# Patient Record
Sex: FEMALE | Race: OTHER
[De-identification: names, ages, dates, MRNs, and addresses within clinical notes are randomized per-mention and may not be internally consistent; named-entity substitution may affect disease eponyms.]

---

## 2019-07-16 ENCOUNTER — HOSPITAL ENCOUNTER (EMERGENCY)
Dept: HOSPITAL 72 - EMR | Age: 29
Discharge: HOME | End: 2019-07-16
Payer: SELF-PAY

## 2019-07-16 VITALS — BODY MASS INDEX: 24.84 KG/M2 | HEIGHT: 62 IN | WEIGHT: 135 LBS

## 2019-07-16 VITALS — DIASTOLIC BLOOD PRESSURE: 78 MMHG | SYSTOLIC BLOOD PRESSURE: 123 MMHG

## 2019-07-16 VITALS — DIASTOLIC BLOOD PRESSURE: 65 MMHG | SYSTOLIC BLOOD PRESSURE: 125 MMHG
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
## 2019-07-16 DIAGNOSIS — S16.1XXA: Primary | ICD-10-CM

## 2019-07-16 PROCEDURE — 72040 X-RAY EXAM NECK SPINE 2-3 VW: CPT

## 2019-07-16 PROCEDURE — 81025 URINE PREGNANCY TEST: CPT

## 2019-07-16 PROCEDURE — 99284 EMERGENCY DEPT VISIT MOD MDM: CPT
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
